# Patient Record
Sex: FEMALE | Race: BLACK OR AFRICAN AMERICAN | NOT HISPANIC OR LATINO | Employment: UNEMPLOYED | URBAN - NONMETROPOLITAN AREA
[De-identification: names, ages, dates, MRNs, and addresses within clinical notes are randomized per-mention and may not be internally consistent; named-entity substitution may affect disease eponyms.]

---

## 2023-10-04 ENCOUNTER — OFFICE VISIT (OUTPATIENT)
Dept: FAMILY MEDICINE CLINIC | Facility: CLINIC | Age: 48
End: 2023-10-04
Payer: COMMERCIAL

## 2023-10-04 VITALS
WEIGHT: 221.5 LBS | SYSTOLIC BLOOD PRESSURE: 124 MMHG | DIASTOLIC BLOOD PRESSURE: 74 MMHG | BODY MASS INDEX: 31.71 KG/M2 | HEIGHT: 70 IN

## 2023-10-04 DIAGNOSIS — D50.9 IRON DEFICIENCY ANEMIA, UNSPECIFIED IRON DEFICIENCY ANEMIA TYPE: ICD-10-CM

## 2023-10-04 DIAGNOSIS — E03.9 ACQUIRED HYPOTHYROIDISM: Primary | ICD-10-CM

## 2023-10-04 DIAGNOSIS — R73.9 HYPERGLYCEMIA: ICD-10-CM

## 2023-10-04 DIAGNOSIS — Z11.59 NEED FOR HEPATITIS C SCREENING TEST: ICD-10-CM

## 2023-10-04 DIAGNOSIS — E78.2 MIXED HYPERLIPIDEMIA: ICD-10-CM

## 2023-10-04 DIAGNOSIS — J30.9 ALLERGIC RHINITIS, UNSPECIFIED SEASONALITY, UNSPECIFIED TRIGGER: ICD-10-CM

## 2023-10-04 PROCEDURE — 36415 COLL VENOUS BLD VENIPUNCTURE: CPT | Performed by: PHYSICIAN ASSISTANT

## 2023-10-04 RX ORDER — LEVOTHYROXINE SODIUM 25 UG/1
25 CAPSULE ORAL DAILY
Qty: 90 CAPSULE | Refills: 0 | Status: SHIPPED | OUTPATIENT
Start: 2023-10-04

## 2023-10-04 RX ORDER — LEVOTHYROXINE SODIUM 25 UG/1
1 CAPSULE ORAL DAILY
COMMUNITY
Start: 2023-08-15 | End: 2023-10-04 | Stop reason: SDUPTHER

## 2023-10-04 RX ORDER — PRAVASTATIN SODIUM 20 MG
20 TABLET ORAL DAILY
COMMUNITY
Start: 2023-08-15 | End: 2023-10-04 | Stop reason: SDUPTHER

## 2023-10-04 RX ORDER — FERROUS SULFATE 325(65) MG
1 TABLET ORAL EVERY 12 HOURS SCHEDULED
COMMUNITY
Start: 2023-08-11

## 2023-10-04 RX ORDER — LORATADINE 10 MG/1
1 TABLET ORAL DAILY
COMMUNITY
Start: 2023-06-16

## 2023-10-04 RX ORDER — PRAVASTATIN SODIUM 20 MG
20 TABLET ORAL DAILY
Qty: 90 TABLET | Refills: 1 | Status: SHIPPED | OUTPATIENT
Start: 2023-10-04

## 2023-10-04 NOTE — PROGRESS NOTES
New Patient Office Visit      Date: 10/04/2023   Patient Name: Stephanie Parker  : 1975   MRN: [id number]     Chief Complaint:    Chief Complaint   Patient presents with    [facility]       History of Present Illness: Stephanie Parker is a 48 y.o. female who is here today to establish care.  She denies any acute complaints at this time. She had been seeing a PCP previously, but she has been out of cholesterol medication x 2 weeks.    Subjective      Past Medical History:   Past Medical History:   Diagnosis Date    Anemia 443646    Hyperlipidemia 665299    Hypothyroidism 529826    Sickle cell anemia 348279    Visual impairment [id number]       Past Surgical History:   Past Surgical History:   Procedure Laterality Date    [facility]       Family History:   Family History   Problem Relation Age of Onset    Ovarian cancer Mother     Hypertension Mother     Hyperlipidemia Mother     Sickle cell trait Father     Heart disease Brother         heart transplant    Hypertension Brother     Hypertension Brother     COPD Maternal Grandmother     Hypertension Maternal Grandmother     Hearing loss Son     No Known Problems Other        Social History:   Social History     Socioeconomic History    Marital status: Single   Tobacco Use    Smoking status: Every Day     Packs/day: 1.00     Years: 20.00     Additional pack years: 0.00     Total pack years: 20.00     Types: Cigarettes   Substance and Sexual Activity    Alcohol use: Yes     Comment: Occasionally    Drug use: Never       Medications:     Current Outpatient Medications:     FeroSul 325 (65 Fe) MG tablet, Take 1 tablet by mouth Every 12 (Twelve) Hours., Disp: , Rfl:     levothyroxine sodium (TIROSINT) 25 MCG capsule, Take 1 capsule by mouth Daily., Disp: 90 capsule, Rfl: 0    loratadine (CLARITIN) 10 MG tablet, Take 1 tablet by mouth Daily., Disp: , Rfl:     pravastatin (PRAVACHOL) 20 MG tablet, Take 1 tablet by mouth Daily., Disp: 90 tablet, Rfl:  "1    Allergies:   No Known Allergies    Objective     Physical Exam:  Vital Signs:   Vitals:    10/04/23 1305   BP: 124/74   Weight: 100 kg (221 lb 8 oz)   Height: 177.8 cm (70\")   PainSc: 0-No pain     Body mass index is 31.78 kg/m².       Physical Exam  Vitals and nursing note reviewed.   Constitutional:       General: She is not in acute distress.     Appearance: Normal appearance. She is not ill-appearing.   HENT:      Head: Normocephalic and atraumatic.   Cardiovascular:      Rate and Rhythm: Normal rate and regular rhythm.      Pulses: Normal pulses.      Heart sounds: Normal heart sounds.   Pulmonary:      Effort: Pulmonary effort is normal. No respiratory distress.      Breath sounds: Normal breath sounds.   Musculoskeletal:      Right lower leg: No edema.      Left lower leg: No edema.   Skin:     General: Skin is warm and dry.   Neurological:      General: No focal deficit present.      Mental Status: She is alert and oriented to person, place, and time.      Coordination: Coordination normal.      Gait: Gait normal.   Psychiatric:         Mood and Affect: Mood normal.         Behavior: Behavior normal.         Assessment / Plan      Assessment/Plan:   Diagnoses and all orders for this visit:    1. Acquired hypothyroidism (Primary)  Assessment & Plan:  Hypothyroidism is chronic per pt hx. Level will be checked on labs, and we will continue current medications pending results.     Orders:  -     TSH; Future  -     T4, Free; Future  -     levothyroxine sodium (TIROSINT) 25 MCG capsule; Take 1 capsule by mouth Daily.  Dispense: 90 capsule; Refill: 0  -     TSH  -     T4, Free    2. Mixed hyperlipidemia  Assessment & Plan:  HLD is chronic, and we will check her level today. She will resume Pravastatin as previously prescribed.     Orders:  -     Comprehensive Metabolic Panel; Future  -     Lipid Panel; Future  -     pravastatin (PRAVACHOL) 20 MG tablet; Take 1 tablet by mouth Daily.  Dispense: 90 tablet; " Refill: 1  -     Comprehensive Metabolic Panel  -     Lipid Panel    3. Allergic rhinitis, unspecified seasonality, unspecified trigger  Assessment & Plan:  Allergies are chronic and currently controlled on Loratadine OTC.       4. Iron deficiency anemia, unspecified iron deficiency anemia type  Assessment & Plan:  She has a hx of iron deficiency, so we will repeat levels on labs.     Orders:  -     CBC Auto Differential; Future  -     Iron Profile; Future  -     CBC Auto Differential  -     Iron Profile    5. Need for hepatitis C screening test  -     HCV Antibody Rfx To Qnt PCR; Future  -     HCV Antibody Rfx To Qnt PCR  -     Interpretation:    6. Hyperglycemia  -     Hemoglobin A1c; Future  -     Hemoglobin A1c         Follow Up:   Return in about 3 months (around 1/4/2024) for Annual Physical.    Pauline Johansen PA-C  [facility] Internal Medicine [facility]

## 2023-10-05 LAB
ALBUMIN SERPL-MCNC: 4.5 G/DL (ref 3.9–4.9)
ALBUMIN/GLOB SERPL: 1.7 {RATIO} (ref 1.2–2.2)
ALP SERPL-CCNC: 54 IU/L (ref 44–121)
ALT SERPL-CCNC: 12 IU/L (ref 0–32)
AST SERPL-CCNC: 14 IU/L (ref 0–40)
BASOPHILS # BLD AUTO: 0.1 X10E3/UL (ref 0–0.2)
BASOPHILS NFR BLD AUTO: 1 %
BILIRUB SERPL-MCNC: 0.5 MG/DL (ref 0–1.2)
BUN SERPL-MCNC: 9 MG/DL (ref 6–24)
BUN/CREAT SERPL: 9 (ref 9–23)
CALCIUM SERPL-MCNC: 10 MG/DL (ref 8.7–10.2)
CHLORIDE SERPL-SCNC: 101 MMOL/L (ref 96–106)
CHOLEST SERPL-MCNC: 262 MG/DL (ref 100–199)
CO2 SERPL-SCNC: 22 MMOL/L (ref 20–29)
CREAT SERPL-MCNC: 0.95 MG/DL (ref 0.57–1)
EGFRCR SERPLBLD CKD-EPI 2021: 74 ML/MIN/1.73
EOSINOPHIL # BLD AUTO: 0.2 X10E3/UL (ref 0–0.4)
EOSINOPHIL NFR BLD AUTO: 2 %
ERYTHROCYTE [DISTWIDTH] IN BLOOD BY AUTOMATED COUNT: 13.8 % (ref 11.7–15.4)
GLOBULIN SER CALC-MCNC: 2.7 G/DL (ref 1.5–4.5)
GLUCOSE SERPL-MCNC: NORMAL MG/DL
HBA1C MFR BLD: 5.7 % (ref 4.8–5.6)
HCT VFR BLD AUTO: 36.3 % (ref 34–46.6)
HCV AB SERPL QL IA: NORMAL
HCV IGG SERPL QL IA: NON REACTIVE
HDLC SERPL-MCNC: 38 MG/DL
HGB BLD-MCNC: 12.1 G/DL (ref 11.1–15.9)
IMM GRANULOCYTES # BLD AUTO: 0 X10E3/UL (ref 0–0.1)
IMM GRANULOCYTES NFR BLD AUTO: 0 %
IRON SATN MFR SERPL: 21 % (ref 15–55)
IRON SERPL-MCNC: 68 UG/DL (ref 27–159)
LABORATORY COMMENT REPORT: ABNORMAL
LDLC SERPL CALC-MCNC: 192 MG/DL (ref 0–99)
LYMPHOCYTES # BLD AUTO: 4.8 X10E3/UL (ref 0.7–3.1)
LYMPHOCYTES NFR BLD AUTO: 35 %
MCH RBC QN AUTO: 27.4 PG (ref 26.6–33)
MCHC RBC AUTO-ENTMCNC: 33.3 G/DL (ref 31.5–35.7)
MCV RBC AUTO: 82 FL (ref 79–97)
MONOCYTES # BLD AUTO: 1.1 X10E3/UL (ref 0.1–0.9)
MONOCYTES NFR BLD AUTO: 8 %
NEUTROPHILS # BLD AUTO: 7.4 X10E3/UL (ref 1.4–7)
NEUTROPHILS NFR BLD AUTO: 54 %
PLATELET # BLD AUTO: 428 X10E3/UL (ref 150–450)
POTASSIUM SERPL-SCNC: NORMAL MMOL/L
PROT SERPL-MCNC: 7.2 G/DL (ref 6–8.5)
RBC # BLD AUTO: 4.41 X10E6/UL (ref 3.77–5.28)
SODIUM SERPL-SCNC: 140 MMOL/L (ref 134–144)
T4 FREE SERPL-MCNC: 1.32 NG/DL (ref 0.82–1.77)
TIBC SERPL-MCNC: 318 UG/DL (ref 250–450)
TRIGL SERPL-MCNC: 168 MG/DL (ref 0–149)
TSH SERPL DL<=0.005 MIU/L-ACNC: 2.85 UIU/ML (ref 0.45–4.5)
UIBC SERPL-MCNC: 250 UG/DL (ref 131–425)
VLDLC SERPL CALC-MCNC: 32 MG/DL (ref 5–40)
WBC # BLD AUTO: 13.6 X10E3/UL (ref 3.4–10.8)

## 2023-11-05 NOTE — ASSESSMENT & PLAN NOTE
Hypothyroidism is chronic per pt hx. Level will be checked on labs, and we will continue current medications pending results.

## 2023-11-05 NOTE — ASSESSMENT & PLAN NOTE
HLD is chronic, and we will check her level today. She will resume Pravastatin as previously prescribed.

## 2024-01-04 ENCOUNTER — OFFICE VISIT (OUTPATIENT)
Dept: FAMILY MEDICINE CLINIC | Facility: CLINIC | Age: 49
End: 2024-01-04
Payer: COMMERCIAL

## 2024-01-04 VITALS
HEART RATE: 94 BPM | DIASTOLIC BLOOD PRESSURE: 72 MMHG | HEIGHT: 70 IN | TEMPERATURE: 98.4 F | BODY MASS INDEX: 30.49 KG/M2 | OXYGEN SATURATION: 98 % | SYSTOLIC BLOOD PRESSURE: 130 MMHG | WEIGHT: 213 LBS

## 2024-01-04 DIAGNOSIS — Z12.11 ENCOUNTER FOR SCREENING FOR MALIGNANT NEOPLASM OF COLON: ICD-10-CM

## 2024-01-04 DIAGNOSIS — E66.09 CLASS 1 OBESITY DUE TO EXCESS CALORIES WITH SERIOUS COMORBIDITY AND BODY MASS INDEX (BMI) OF 30.0 TO 30.9 IN ADULT: ICD-10-CM

## 2024-01-04 DIAGNOSIS — R73.03 PREDIABETES: ICD-10-CM

## 2024-01-04 DIAGNOSIS — Z00.00 GENERAL MEDICAL EXAM: Primary | ICD-10-CM

## 2024-01-04 DIAGNOSIS — Z23 NEED FOR VACCINATION: ICD-10-CM

## 2024-01-04 DIAGNOSIS — D50.9 IRON DEFICIENCY ANEMIA, UNSPECIFIED IRON DEFICIENCY ANEMIA TYPE: ICD-10-CM

## 2024-01-04 DIAGNOSIS — E03.9 ACQUIRED HYPOTHYROIDISM: ICD-10-CM

## 2024-01-04 DIAGNOSIS — E78.2 MIXED HYPERLIPIDEMIA: ICD-10-CM

## 2024-01-04 PROCEDURE — 36415 COLL VENOUS BLD VENIPUNCTURE: CPT | Performed by: PHYSICIAN ASSISTANT

## 2024-01-04 NOTE — PROGRESS NOTES
Female Physical Note      Date: 2024   Patient Name: Stephanie Parker  : 1975   MRN: [id number]     Chief Complaint:    Chief Complaint   Patient presents with    Annual Exam       History of Present Illness: Stephanie Parker is a 48 y.o. female who is here today for their annual health maintenance and physical.  She denies any acute complaints at this time.    Subjective      Review of Systems:   Review of Systems   Constitutional:  Negative for activity change, appetite change, fatigue, fever, unexpected weight gain and unexpected weight loss.   HENT:  Negative for congestion, ear discharge, ear pain, postnasal drip, rhinorrhea, sinus pressure, sneezing, sore throat, trouble swallowing and voice change.    Eyes:  Negative for blurred vision, double vision, photophobia, pain, itching and visual disturbance.   Respiratory:  Negative for apnea, cough, chest tightness, shortness of breath and wheezing.    Cardiovascular:  Negative for chest pain, palpitations and leg swelling.   Gastrointestinal:  Negative for abdominal pain, blood in stool, constipation, diarrhea, nausea, vomiting and GERD.   Endocrine: Negative for cold intolerance, heat intolerance, polydipsia and polyuria.   Genitourinary:  Negative for decreased urine volume, difficulty urinating, dysuria, flank pain, frequency, hematuria, urgency and urinary incontinence.   Musculoskeletal:  Negative for arthralgias, back pain, gait problem, joint swelling and myalgias.   Skin:  Negative for rash, skin lesions and wound.   Allergic/Immunologic: Negative for environmental allergies and food allergies.   Neurological:  Negative for dizziness, syncope, weakness, light-headedness, numbness and headache.   Hematological:  Negative for adenopathy. Does not bruise/bleed easily.   Psychiatric/Behavioral:  Negative for decreased concentration, dysphoric mood, sleep disturbance, depressed mood and stress. The patient is not nervous/anxious.        Past  "Medical History, Social History, Family History and Care Team were all reviewed with patient and updated as appropriate.     Medications:     Current Outpatient Medications:     FeroSul 325 (65 Fe) MG tablet, Take 1 tablet by mouth Every 12 (Twelve) Hours., Disp: , Rfl:     loratadine (CLARITIN) 10 MG tablet, Take 1 tablet by mouth Daily., Disp: , Rfl:     pravastatin (PRAVACHOL) 20 MG tablet, Take 1 tablet by mouth Daily., Disp: 90 tablet, Rfl: 1    levothyroxine sodium (TIROSINT) 25 MCG capsule, Take 1 capsule by mouth Daily., Disp: 90 capsule, Rfl: 0    Allergies:   No Known Allergies    Health Maintenance   Topic Date Due    COLORECTAL CANCER SCREENING  Never done    ANNUAL PHYSICAL  Never done    COVID-19 Vaccine (1) 01/01/2025 (Originally 1975)    TDAP/TD VACCINES (1 - Tdap) 01/01/2026 (Originally 3/21/1995)    LIPID PANEL  01/04/2025    BMI FOLLOWUP  01/04/2025    PAP SMEAR  12/02/2026    HEPATITIS C SCREENING  Completed    INFLUENZA VACCINE  Completed    Pneumococcal Vaccine 0-64  Discontinued       Diet/Physical activity:   She has been improving her diet and eating a more vegetables    She has reduced her portions.     No formal exercise, but she is active in her life.     Sexual Health: Denies concerns.    Intimate partner violence: Denies concerns.    Objective     Physical Exam:  Vital Signs:   Vitals:    01/04/24 0810   BP: 130/72   Pulse: 94   Temp: 98.4 °F (36.9 °C)   SpO2: 98%   Weight: 96.6 kg (213 lb)   Height: 177.8 cm (70\")   PainSc: 0-No pain     Body mass index is 30.56 kg/m².     Physical Exam  Vitals and nursing note reviewed.   Constitutional:       General: She is not in acute distress.     Appearance: Normal appearance. She is not ill-appearing.   HENT:      Head: Normocephalic and atraumatic.   Cardiovascular:      Rate and Rhythm: Normal rate and regular rhythm.      Pulses: Normal pulses.      Heart sounds: Normal heart sounds.   Pulmonary:      Effort: Pulmonary effort is normal. " No respiratory distress.      Breath sounds: Normal breath sounds.   Musculoskeletal:      Right lower leg: No edema.      Left lower leg: No edema.   Skin:     General: Skin is warm and dry.   Neurological:      General: No focal deficit present.      Mental Status: She is alert and oriented to person, place, and time.      Coordination: Coordination normal.      Gait: Gait normal.   Psychiatric:         Mood and Affect: Mood normal.         Behavior: Behavior normal.       Assessment / Plan      Assessment/Plan:   Diagnoses and all orders for this visit:    1. General medical exam (Primary)    2. Mixed hyperlipidemia  Assessment & Plan:  Hyperlipidemia is chronic per patient history.  Level will be checked on labs, and she will continue compliance on pravastatin.    Orders:  -     CBC Auto Differential; Future  -     Comprehensive Metabolic Panel; Future  -     Lipid Panel; Future  -     CBC Auto Differential  -     Comprehensive Metabolic Panel  -     Lipid Panel    3. Acquired hypothyroidism  Assessment & Plan:  Hypothyroidism is chronic per patient history.  TSH will be checked on today's labs, and she will continue current medications pending results.    Orders:  -     TSH; Future  -     T4, Free; Future  -     TSH  -     T4, Free    4. Iron deficiency anemia, unspecified iron deficiency anemia type  Assessment & Plan:  Levels will be repeated on today's labs to monitor absorption.    Orders:  -     Iron Profile; Future  -     Iron Profile    5. Prediabetes  Assessment & Plan:  Low sugar and low carbohydrate diet and exercise recommended.  A1c will be checked on labs.    Orders:  -     Hemoglobin A1c; Future  -     Hemoglobin A1c    6. Class 1 obesity due to excess calories with serious comorbidity and body mass index (BMI) of 30.0 to 30.9 in adult  Assessment & Plan:  Patient's (Body mass index is 30.56 kg/m².) indicates that they are obese (BMI >30) with health conditions that include dyslipidemias . Weight  is improving with lifestyle modifications. BMI  is above average; BMI management plan is completed. We discussed portion control and increasing exercise.       7. Encounter for screening for malignant neoplasm of colon  -     Ambulatory Referral For Screening Colonoscopy    8. Need for vaccination  -     Fluzone >6 Months ([id number])        Follow Up:   Return in about 6 months (around 7/4/2024) for next scheduled follow up.    Healthcare Maintenance:   Discussed injury prevention, diet and exercise, safe sexual practices, and screening for common diseases. Encouraged use of sunscreen and seatbelts. Encouraged SBE, avoidance of tobacco, limiting alcohol, and yearly dental and eye exams.   Stephanie Parker voices understanding and acceptance of this advice and will call back with any further questions or concerns. AVS with preventive healthcare tips printed for patient.     Pauline Johansen PA-C  [facility]

## 2024-01-05 LAB
ALBUMIN SERPL-MCNC: 4.8 G/DL (ref 3.9–4.9)
ALBUMIN/GLOB SERPL: 1.7 {RATIO} (ref 1.2–2.2)
ALP SERPL-CCNC: 53 IU/L (ref 44–121)
ALT SERPL-CCNC: 11 IU/L (ref 0–32)
AST SERPL-CCNC: 15 IU/L (ref 0–40)
BASOPHILS # BLD AUTO: 0.1 X10E3/UL (ref 0–0.2)
BASOPHILS NFR BLD AUTO: 0 %
BILIRUB SERPL-MCNC: 0.9 MG/DL (ref 0–1.2)
BUN SERPL-MCNC: 9 MG/DL (ref 6–24)
BUN/CREAT SERPL: 10 (ref 9–23)
CALCIUM SERPL-MCNC: 9.9 MG/DL (ref 8.7–10.2)
CHLORIDE SERPL-SCNC: 101 MMOL/L (ref 96–106)
CHOLEST SERPL-MCNC: 211 MG/DL (ref 100–199)
CO2 SERPL-SCNC: 22 MMOL/L (ref 20–29)
CREAT SERPL-MCNC: 0.94 MG/DL (ref 0.57–1)
EGFRCR SERPLBLD CKD-EPI 2021: 75 ML/MIN/1.73
EOSINOPHIL # BLD AUTO: 0.2 X10E3/UL (ref 0–0.4)
EOSINOPHIL NFR BLD AUTO: 2 %
ERYTHROCYTE [DISTWIDTH] IN BLOOD BY AUTOMATED COUNT: 13.3 % (ref 11.7–15.4)
GLOBULIN SER CALC-MCNC: 2.8 G/DL (ref 1.5–4.5)
GLUCOSE SERPL-MCNC: 95 MG/DL (ref 70–99)
HBA1C MFR BLD: 5.5 % (ref 4.8–5.6)
HCT VFR BLD AUTO: 38.8 % (ref 34–46.6)
HDLC SERPL-MCNC: 42 MG/DL
HGB BLD-MCNC: 12.9 G/DL (ref 11.1–15.9)
IMM GRANULOCYTES # BLD AUTO: 0 X10E3/UL (ref 0–0.1)
IMM GRANULOCYTES NFR BLD AUTO: 0 %
IRON SATN MFR SERPL: 28 % (ref 15–55)
IRON SERPL-MCNC: 101 UG/DL (ref 27–159)
LDLC SERPL CALC-MCNC: 151 MG/DL (ref 0–99)
LYMPHOCYTES # BLD AUTO: 4.3 X10E3/UL (ref 0.7–3.1)
LYMPHOCYTES NFR BLD AUTO: 32 %
MCH RBC QN AUTO: 27.7 PG (ref 26.6–33)
MCHC RBC AUTO-ENTMCNC: 33.2 G/DL (ref 31.5–35.7)
MCV RBC AUTO: 83 FL (ref 79–97)
MONOCYTES # BLD AUTO: 1 X10E3/UL (ref 0.1–0.9)
MONOCYTES NFR BLD AUTO: 8 %
NEUTROPHILS # BLD AUTO: 7.8 X10E3/UL (ref 1.4–7)
NEUTROPHILS NFR BLD AUTO: 58 %
PLATELET # BLD AUTO: 438 X10E3/UL (ref 150–450)
POTASSIUM SERPL-SCNC: 4.3 MMOL/L (ref 3.5–5.2)
PROT SERPL-MCNC: 7.6 G/DL (ref 6–8.5)
RBC # BLD AUTO: 4.66 X10E6/UL (ref 3.77–5.28)
SODIUM SERPL-SCNC: 138 MMOL/L (ref 134–144)
T4 FREE SERPL-MCNC: 1.25 NG/DL (ref 0.82–1.77)
TIBC SERPL-MCNC: 360 UG/DL (ref 250–450)
TRIGL SERPL-MCNC: 99 MG/DL (ref 0–149)
TSH SERPL DL<=0.005 MIU/L-ACNC: 4.57 UIU/ML (ref 0.45–4.5)
UIBC SERPL-MCNC: 259 UG/DL (ref 131–425)
VLDLC SERPL CALC-MCNC: 18 MG/DL (ref 5–40)
WBC # BLD AUTO: 13.5 X10E3/UL (ref 3.4–10.8)

## 2024-01-06 PROBLEM — R73.03 PREDIABETES: Status: ACTIVE | Noted: 2024-01-06

## 2024-01-06 PROBLEM — E66.09 CLASS 1 OBESITY DUE TO EXCESS CALORIES WITH SERIOUS COMORBIDITY AND BODY MASS INDEX (BMI) OF 30.0 TO 30.9 IN ADULT: Status: ACTIVE | Noted: 2024-01-06

## 2024-01-06 NOTE — ASSESSMENT & PLAN NOTE
Hypothyroidism is chronic per patient history.  TSH will be checked on today's labs, and she will continue current medications pending results.

## 2024-01-06 NOTE — ASSESSMENT & PLAN NOTE
Hyperlipidemia is chronic per patient history.  Level will be checked on labs, and she will continue compliance on pravastatin.

## 2024-02-25 DIAGNOSIS — E03.9 ACQUIRED HYPOTHYROIDISM: ICD-10-CM

## 2024-02-26 RX ORDER — LEVOTHYROXINE SODIUM 25 UG/1
25 CAPSULE ORAL DAILY
Qty: 90 CAPSULE | Refills: 1 | Status: SHIPPED | OUTPATIENT
Start: 2024-02-26

## 2024-03-07 DIAGNOSIS — E78.2 MIXED HYPERLIPIDEMIA: ICD-10-CM

## 2024-03-08 RX ORDER — PRAVASTATIN SODIUM 20 MG
20 TABLET ORAL DAILY
Qty: 90 TABLET | Refills: 1 | Status: SHIPPED | OUTPATIENT
Start: 2024-03-08